# Patient Record
Sex: FEMALE
[De-identification: names, ages, dates, MRNs, and addresses within clinical notes are randomized per-mention and may not be internally consistent; named-entity substitution may affect disease eponyms.]

---

## 2019-04-12 ENCOUNTER — HOSPITAL ENCOUNTER (EMERGENCY)
Dept: HOSPITAL 31 - C.ER | Age: 49
Discharge: HOME | End: 2019-04-12
Payer: MEDICAID

## 2019-04-12 VITALS — HEART RATE: 71 BPM | DIASTOLIC BLOOD PRESSURE: 39 MMHG | SYSTOLIC BLOOD PRESSURE: 136 MMHG | TEMPERATURE: 99 F

## 2019-04-12 VITALS — RESPIRATION RATE: 16 BRPM

## 2019-04-12 VITALS — BODY MASS INDEX: 33.7 KG/M2

## 2019-04-12 VITALS — OXYGEN SATURATION: 100 %

## 2019-04-12 DIAGNOSIS — O01.9: Primary | ICD-10-CM

## 2019-04-12 LAB
ALBUMIN SERPL-MCNC: 4.4 G/DL (ref 3.5–5)
ALBUMIN/GLOB SERPL: 1.6 {RATIO} (ref 1–2.1)
ALT SERPL-CCNC: < 6 U/L (ref 9–52)
AST SERPL-CCNC: 27 U/L (ref 14–36)
BASOPHILS # BLD AUTO: 0 K/UL (ref 0–0.2)
BASOPHILS NFR BLD: 0.4 % (ref 0–2)
BILIRUB UR-MCNC: NEGATIVE MG/DL
BUN SERPL-MCNC: 8 MG/DL (ref 7–17)
CALCIUM SERPL-MCNC: 9.3 MG/DL (ref 8.6–10.4)
EOSINOPHIL # BLD AUTO: 0.1 K/UL (ref 0–0.7)
EOSINOPHIL NFR BLD: 0.8 % (ref 0–4)
ERYTHROCYTE [DISTWIDTH] IN BLOOD BY AUTOMATED COUNT: 13.9 % (ref 11.5–14.5)
GFR NON-AFRICAN AMERICAN: > 60
GLUCOSE UR STRIP-MCNC: NORMAL MG/DL
HGB BLD-MCNC: 13.9 G/DL (ref 11–16)
LEUKOCYTE ESTERASE UR-ACNC: (no result) LEU/UL
LIPASE: 123 U/L (ref 23–300)
LYMPHOCYTES # BLD AUTO: 2.2 K/UL (ref 1–4.3)
LYMPHOCYTES NFR BLD AUTO: 29 % (ref 20–40)
MCH RBC QN AUTO: 32.6 PG (ref 27–31)
MCHC RBC AUTO-ENTMCNC: 34.3 G/DL (ref 33–37)
MCV RBC AUTO: 95 FL (ref 81–99)
MONOCYTES # BLD: 0.5 K/UL (ref 0–0.8)
MONOCYTES NFR BLD: 6.6 % (ref 0–10)
NEUTROPHILS # BLD: 4.7 K/UL (ref 1.8–7)
NEUTROPHILS NFR BLD AUTO: 63.2 % (ref 50–75)
NRBC BLD AUTO-RTO: 0 % (ref 0–2)
PH UR STRIP: 6 [PH] (ref 5–8)
PLATELET # BLD: 196 K/UL (ref 130–400)
PMV BLD AUTO: 9.1 FL (ref 7.2–11.7)
PROT UR STRIP-MCNC: NEGATIVE MG/DL
RBC # BLD AUTO: 4.26 MIL/UL (ref 3.8–5.2)
RBC # UR STRIP: NEGATIVE /UL
SP GR UR STRIP: 1.02 (ref 1–1.03)
SQUAMOUS EPITHIAL: 5 /HPF (ref 0–5)
UROBILINOGEN UR-MCNC: NORMAL MG/DL (ref 0.2–1)
WBC # BLD AUTO: 7.4 K/UL (ref 4.8–10.8)

## 2019-04-12 NOTE — CP.PCM.CON
<Damaris Booker - Last Filed: 19 19:21>





History of Present Illness





- History of Present Illness


History of Present Illness: 





OBGYN Consult Note for Dr. Rainey





48 year old  at 12.1 weeks gestational age by LMP 2019 presents to 

the ER with abdominal pain and bleeding. During her visit to the ER, patient 

states that her lower abdominal pain was just this morning and it has resolved. 

She also noted some bleeding for two days that was not much but has diminished. 

She is a patient of [facility]. Patient feels fine now. Of note, no 

more than a month ago, she was told that her fetus is not living. But she did 

not follow up with a doctor for care at a later time.





Last time she ate was very little with coffee at 7am this morning. She last 

drank water provided by the ER to increase her bladder volume for her 

transvaginal ultrasound. Patient is not in any type of pain now.





PMHx: denies


PSHx: lap cholecystectomy due to gallbladder cysts about 10 years ago


SocHx: Denies tobacco, EtOH rarely (few times a year during holidays), and 

denies illicit drugs.


Meds: denies taking medications now


Allergies: NKDA





Past Patient History





- Past Social History


Smoking Status: Never Smoked





- PSYCHIATRIC


Hx Substance Use: No





- SURGICAL HISTORY


Hx Cholecystectomy: Yes





- ANESTHESIA


Hx Anesthesia: Yes





Meds


Allergies/Adverse Reactions: 


                                    Allergies











Allergy/AdvReac Type Severity Reaction Status Date / Time


 


No Known Allergies Allergy   Verified 19 14:41














Physical Exam





- Constitutional


Appears: Well, Non-toxic





- Head Exam


Head Exam: ATRAUMATIC, NORMAL INSPECTION





- Eye Exam


Eye Exam: Normal appearance





- Neck Exam


Neck exam: Positive for: Normal Inspection





- Respiratory Exam


Respiratory Exam: Clear to Auscultation Bilateral, NORMAL BREATHING PATTERN





- Cardiovascular Exam


Cardiovascular Exam: REGULAR RHYTHM, RRR





-  Exam


Speculum exam: NORMAL SPECULUM EXAM.  absent: Erythema, Foreign Body, 

Laceration, Tissue, Vaginal Bleeding, Vaginal Discharge


Bimanual exam: NORMAL BIMANUAL EXAM (Mild tenderness to palpation of the 

suprapubic area during bimanual exam).  absent: Adenexal Mass





- Back Exam


Back exam: NORMAL INSPECTION





- Neurological Exam


Neurological exam: Alert, Oriented x3





- Psychiatric Exam


Psychiatric exam: Normal Affect, Normal Mood





- Skin


Skin Exam: Normal Color, Warm





Results





- Vital Signs


Recent Vital Signs: 


                                Last Vital Signs











Temp  99.0 F   19 17:11


 


Pulse  71   19 17:11


 


Resp  16   19 17:11


 


BP  136/39 L  19 17:11


 


Pulse Ox  100   19 18:39














- Labs


Result Diagrams: 


                                 19 15:26





                                 19 15:26


Labs: 


                         Laboratory Results - last 24 hr











  19





  15:26 15:26 15:26


 


WBC  7.4  


 


RBC  4.26  


 


Hgb  13.9  


 


Hct  40.4  


 


MCV  95.0  


 


MCH  32.6 H  


 


MCHC  34.3  


 


RDW  13.9  


 


Plt Count  196  


 


MPV  9.1  


 


Neut % (Auto)  63.2  


 


Lymph % (Auto)  29.0  


 


Mono % (Auto)  6.6  


 


Eos % (Auto)  0.8  


 


Baso % (Auto)  0.4  


 


Neut # (Auto)  4.7  


 


Lymph # (Auto)  2.2  


 


Mono # (Auto)  0.5  


 


Eos # (Auto)  0.1  


 


Baso # (Auto)  0.0  


 


Sodium    134


 


Potassium    4.0


 


Chloride    102


 


Carbon Dioxide    27


 


Anion Gap    9 L


 


BUN    8


 


Creatinine    0.5 L


 


Est GFR ( Amer)    > 60


 


Est GFR (Non-Af Amer)    > 60


 


Random Glucose    83


 


Calcium    9.3


 


Total Bilirubin    0.5


 


AST    27


 


ALT    < 6 L


 


Alkaline Phosphatase    56


 


Total Protein    7.1


 


Albumin    4.4


 


Globulin    2.7


 


Albumin/Globulin Ratio    1.6


 


Lipase    123


 


Beta HCG, Quant    983671.00


 


Urine Color   Yellow 


 


Urine Clarity   Hazy 


 


Urine pH   6.0 


 


Ur Specific Gravity   1.016 


 


Urine Protein   Negative 


 


Urine Glucose (UA)   Normal 


 


Urine Ketones   Trace 


 


Urine Blood   Negative 


 


Urine Nitrate   Negative 


 


Urine Bilirubin   Negative 


 


Urine Urobilinogen   Normal 


 


Ur Leukocyte Esterase   Neg 


 


Urine WBC (Auto)   2 


 


Urine RBC (Auto)   3 


 


Ur Squamous Epith Cells   5 


 


Blood Type   


 


Antibody Screen   














  19





  15:26


 


WBC 


 


RBC 


 


Hgb 


 


Hct 


 


MCV 


 


MCH 


 


MCHC 


 


RDW 


 


Plt Count 


 


MPV 


 


Neut % (Auto) 


 


Lymph % (Auto) 


 


Mono % (Auto) 


 


Eos % (Auto) 


 


Baso % (Auto) 


 


Neut # (Auto) 


 


Lymph # (Auto) 


 


Mono # (Auto) 


 


Eos # (Auto) 


 


Baso # (Auto) 


 


Sodium 


 


Potassium 


 


Chloride 


 


Carbon Dioxide 


 


Anion Gap 


 


BUN 


 


Creatinine 


 


Est GFR ( Amer) 


 


Est GFR (Non-Af Amer) 


 


Random Glucose 


 


Calcium 


 


Total Bilirubin 


 


AST 


 


ALT 


 


Alkaline Phosphatase 


 


Total Protein 


 


Albumin 


 


Globulin 


 


Albumin/Globulin Ratio 


 


Lipase 


 


Beta HCG, Quant 


 


Urine Color 


 


Urine Clarity 


 


Urine pH 


 


Ur Specific Gravity 


 


Urine Protein 


 


Urine Glucose (UA) 


 


Urine Ketones 


 


Urine Blood 


 


Urine Nitrate 


 


Urine Bilirubin 


 


Urine Urobilinogen 


 


Ur Leukocyte Esterase 


 


Urine WBC (Auto) 


 


Urine RBC (Auto) 


 


Ur Squamous Epith Cells 


 


Blood Type  O POSITIVE


 


Antibody Screen  Negative














Assessment & Plan





- Assessment and Plan (Free Text)


Assessment: 





48 year old  at 12.1 weeks gestational age by LMP 2019 presents to 

the ER with lower abdominal pain and bleeding. Hemodynamically stable, CBC wnl, 

and GYN exam unremarkable.


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 


-Due to patient's recent consumption of water in the ER and increased risk of 

aspiration if she is to go to the OR today, she will need to have D&C scheduled 

another time. Patient is stable so elective surgery is to be done 1-2 weeks. 

After D&C to remove products of conception, will need to follow up with weekly 

HCG until no more HCG.


-patient will be contacted next week after we speak to clinic to inquire about 

her following up. Patient can be reached at [phone]. She agrees to the 

above plan.





case discussed with Dr. Brigid Booker PGY1





<Grecia Rainey S - Last Filed: 04/15/19 10:15>





Physical Exam





- GI/Abdominal Exam


GI & Abdominal Exam: Soft.  absent: Guarding, Tenderness





-  Exam


External exam: NORMAL EXTERNAL EXAM


Bimanual exam: absent: Cervical Motion Tendernes, Uterine Enlargement, Uterine 

Tenderness





- Extremities Exam


Extremities exam: Positive for: normal inspection





Results





- Vital Signs


Recent Vital Signs: 


                                Last Vital Signs











Temp  99.0 F   19 17:11


 


Pulse  71   19 17:11


 


Resp  16   19 17:11


 


BP  136/39 L  19 17:11


 


Pulse Ox  100   19 18:39














- Labs


Result Diagrams: 


                                 19 15:26





                                 19 15:26





Assessment & Plan





- Assessment and Plan (Free Text)


Assessment: 


Late Entry: [facility] attending on call


Agree with note above w/ following  clarification and modifications:Pt 


Pt seen & examined by me w/ dr booker. Pt denied heavy bleeding/pelvic pain and 

states she goes to [facility] for her care.


I: Hydatidiform mole


   AMA


P: Pt advised that this nonviable preg appears to be a molar preg. Risk of 

subsequent ca d/w pt and importance of f/u emphasized. She was told she needs to

have this procedure scheduled in a medical center where she can have follow 

serial HCGS and be referred to oncologist if needed.  She understands and states

she will dos this.


Pt called this morning she stated she was at this very moment in [place] 

for follow up and referral for further management.

## 2019-04-12 NOTE — US
Date of service: 



04/12/2019



HISTORY:

bleeding in pregnancy, assess cervix



LMP 01/17/2019. 



Beta HCG results: [id number] units.



COMPARISON:

None available.



TECHNIQUE:

Transabdominal, transvaginal. Real -time technique with 2D, duplex 

and color Doppler.



FINDINGS:



UTERUS:

Measures 7.2 x 5.8 x 11.2 cm. Heterogeneous echo characteristics. 

Solitary submucosal fibroid lower uterine segment 0.8 x 1.2 cm.



ENDOMETRIUM:

Measures 32.2 mm in diameter. Markedly thickened, irregular 

heterogeneous echo characteristics of the endometrium.  No visible 

products of conception. 



CERVIX:

Closed cervix measures 3.57 cm.



RIGHT OVARY:

Measures 2.9 x 4.5 x 4.9 cm. Complex, septated cyst with thick 

irregular wall.  This measures 2.9 x 3.9 x 3.8 cm.  Normal flow. 



LEFT OVARY:

Measures 2.6 x 2.1 x 2.6 cm. No solid mass. Normal flow. 



FREE FLUID:

No significant free fluid noted.



OTHER FINDINGS:

None. 



IMPRESSION:

1. Markedly thickened heterogeneous endometrium.  The findings in 

conjunction with the markedly elevated beta HCG suggests molar 

pregnancy/gestational trophoblastic disease.



2. Septated right adnexal cyst.

## 2019-04-12 NOTE — C.PDOC
History Of Present Illness


Patient is a 48 year old female, 11 weeks pregnant, who presents to the ED c/o 

vaginal bleeding with associated LLQ that began today. Patient reports that she 

had a US at 9 weeks, which showed a possible miscarriage. She denies any fever, 

CP, SOB, or dysuria.  


 





Time Seen by Provider: 04/12/19 14:54


Chief Complaint (Nursing): Abdominal Pain


History Per: Patient


History/Exam Limitations: no limitations


Onset/Duration Of Symptoms: Hrs


Current Symptoms Are (Timing): Still Present


Location Of Pain/Discomfort: LLQ


Associated Symptoms: denies: Fever, Chest Pain, Urinary Symptoms


Recent travel outside of the [place]: No


Additional History Per: Patient


Abnormal Vaginal Bleeding: Yes





Past Medical History


Reviewed: Historical Data, Nursing Documentation, Vital Signs


Vital Signs: 





                                Last Vital Signs











Temp  98.8 F   04/12/19 14:41


 


Pulse  81   04/12/19 14:41


 


Resp  16   04/12/19 14:41


 


BP  145/83   04/12/19 14:41


 


Pulse Ox  100   04/12/19 14:41











Surgical History: Cholecystectomy


Family History: States: No Known Family Hx





- Social History


Hx Alcohol Use: Yes


Hx Substance Use: No





- Immunization History


Hx Tetanus Toxoid Vaccination: No


Hx Influenza Vaccination: No


Hx Pneumococcal Vaccination: No





Review Of Systems


Except As Marked, All Systems Reviewed And Found Negative.


Cardiovascular: Negative for: Chest Pain


Respiratory: Negative for: Shortness of Breath





Physical Exam





- Physical Exam


Additional Physical Exam Comments: 


Constitutional: No acute distress.


Head: Normocephalic. Atraumatic.


Eyes: PERRL.


ENT: Moist mucous membranes.


Neck: Supple.


Cardiovascular: Regular rate. Radial pulse 2+ bilaterally.


Chest: No tenderness.


Respiratory: Clear to auscultation bilaterally.


GI: Soft. Nontender. Nondistended.


Back: No CVA tenderness.


Musculoskeletal: No tenderness or swelling of extremities.


Skin: No rash.


Neurologic: Alert, no focal deficit.








ED Course And Treatment





- Laboratory Results


Result Diagrams: 


                                 04/12/19 15:26





                                 04/12/19 15:26


O2 Sat by Pulse Oximetry: 100 (on RA)


Pulse Ox Interpretation: Normal





Medical Decision Making


Medical Decision Making: 


Plan:


Labs


Urinalysis


Urine Culture


US Transvaginal





Date of service: 





04/12/2019





HISTORY:


bleeding in pregnancy, assess cervix





LMP 01/17/2019. 





Beta HCG results: [id number] units.





COMPARISON:


None available.





TECHNIQUE:


Transabdominal, transvaginal. Real -time technique with 2D, duplex and color 

Doppler.





FINDINGS:





UTERUS:


Measures 7.2 x 5.8 x 11.2 cm. Heterogeneous echo characteristics. Solitary 

submucosal fibroid lower uterine segment 0.8 x 1.2 cm.





ENDOMETRIUM:


Measures 32.2 mm in diameter. Markedly thickened, irregular heterogeneous echo 

characteristics of the endometrium.  No visible products of conception. 





CERVIX:


Closed cervix measures 3.57 cm.





RIGHT OVARY:


Measures 2.9 x 4.5 x 4.9 cm. Complex, septated cyst with thick irregular wall.  

This measures 2.9 x 3.9 x 3.8 cm.  Normal flow. 





LEFT OVARY:


Measures 2.6 x 2.1 x 2.6 cm. No solid mass. Normal flow. 





FREE FLUID:


No significant free fluid noted.





OTHER FINDINGS:


None. 





IMPRESSION:


1. Markedly thickened heterogeneous endometrium.  The findings in conjunction 

with the markedly elevated beta HCG suggests molar pregnancy/gestational 

trophoblastic disease.





2. Septated right adnexal cyst.





Dr. Brigid LAMBERT on call consulted, came to see and evaluate patient at 

bedside in ED. She recommends discharge at this time as patient is stable 

without active bleeding and unremarkable Hb. She is instructing patient to 

follow up with [facility] in [place] to have D&C scheduled. She 

states she will have patient's contact information collected so they can check 

on patient and ensure she followed up expeditiously. 





Disposition





- Disposition


Referrals: 


[facility] [Outside]


Disposition: HOME/ ROUTINE


Disposition Time: 17:55


Condition: STABLE


Instructions:  Gestational Trophoblastic Disease


Forms:  [facility] (English)


Print Language: Lao





- Clinical Impression


Clinical Impression: 


 Molar pregnancy








- Scribe Statement


The provider has reviewed the documentation as recorded by the Marion Sanchez





All medical record entries made by the Julianibramon were at my direction and 

personally dictated by me. I have reviewed the chart and agree that the record 

accurately reflects my personal performance of the history, physical exam, 

medical decision making, and the department course for this patient. I have also

 personally directed, reviewed, and agree with the discharge instructions and 

disposition.

## 2019-04-17 ENCOUNTER — HOSPITAL ENCOUNTER (OUTPATIENT)
Dept: HOSPITAL 14 - H.OPSURG | Age: 49
Discharge: HOME | End: 2019-04-17
Attending: OBSTETRICS & GYNECOLOGY
Payer: MEDICAID

## 2019-04-17 VITALS
OXYGEN SATURATION: 100 % | DIASTOLIC BLOOD PRESSURE: 78 MMHG | HEART RATE: 75 BPM | TEMPERATURE: 98.2 F | SYSTOLIC BLOOD PRESSURE: 132 MMHG

## 2019-04-17 VITALS — RESPIRATION RATE: 18 BRPM

## 2019-04-17 VITALS — BODY MASS INDEX: 34.2 KG/M2

## 2019-04-17 DIAGNOSIS — O02.0: Primary | ICD-10-CM

## 2019-04-17 DIAGNOSIS — Z3A.11: ICD-10-CM

## 2019-04-17 LAB
BASOPHILS # BLD AUTO: 0 K/UL (ref 0–0.2)
BASOPHILS NFR BLD: 0.3 % (ref 0–2)
EOSINOPHIL # BLD AUTO: 0 K/UL (ref 0–0.7)
EOSINOPHIL NFR BLD: 0.7 % (ref 0–4)
ERYTHROCYTE [DISTWIDTH] IN BLOOD BY AUTOMATED COUNT: 14.2 % (ref 11.5–14.5)
HGB BLD-MCNC: 13.8 G/DL (ref 12–16)
LYMPHOCYTES # BLD AUTO: 1.7 K/UL (ref 1–4.3)
LYMPHOCYTES NFR BLD AUTO: 28.8 % (ref 20–40)
MCH RBC QN AUTO: 31.6 PG (ref 27–31)
MCHC RBC AUTO-ENTMCNC: 33.4 G/DL (ref 33–37)
MCV RBC AUTO: 94.5 FL (ref 81–99)
MONOCYTES # BLD: 0.4 K/UL (ref 0–0.8)
MONOCYTES NFR BLD: 6.3 % (ref 0–10)
NEUTROPHILS # BLD: 3.7 K/UL (ref 1.8–7)
NEUTROPHILS NFR BLD AUTO: 63.9 % (ref 50–75)
NRBC BLD AUTO-RTO: 0 % (ref 0–0)
PLATELET # BLD: 186 K/UL (ref 130–400)
PMV BLD AUTO: 9 FL (ref 7.2–11.7)
RBC # BLD AUTO: 4.35 MIL/UL (ref 3.8–5.2)
WBC # BLD AUTO: 5.8 K/UL (ref 4.8–10.8)

## 2019-04-17 PROCEDURE — 86850 RBC ANTIBODY SCREEN: CPT

## 2019-04-17 PROCEDURE — 36415 COLL VENOUS BLD VENIPUNCTURE: CPT

## 2019-04-17 PROCEDURE — 88305 TISSUE EXAM BY PATHOLOGIST: CPT

## 2019-04-17 PROCEDURE — 85025 COMPLETE CBC W/AUTO DIFF WBC: CPT

## 2019-04-17 PROCEDURE — 86920 COMPATIBILITY TEST SPIN: CPT

## 2019-04-17 PROCEDURE — 86900 BLOOD TYPING SEROLOGIC ABO: CPT

## 2019-04-17 PROCEDURE — 59820 CARE OF MISCARRIAGE: CPT

## 2019-04-17 NOTE — CP.SDSHP
Same Day Surgery H & P





- Allergies


Allergies: 


Allergies





No Known Allergies Allergy (Verified 04/17/19 14:52)


   











- Physical Exam


Vital Signs: 


                                   Vital Signs











  04/17/19





  14:49


 


Temperature 98.3 F


 


Pulse Rate 68


 


Respiratory 13





Rate 


 


Blood Pressure 135/79


 


O2 Sat by Pulse 98





Oximetry 














Short Stay Discharge





- Short Stay Discharge


Admitting Diagnosis/Reason for Visit: MOLAR PREGNANCY


Progress Note/Discharge Note with Instructions: 





The patient here for molar pregnancy we discussed appropriate management we 

discussed appropriate follow-up patient aware she needs beta quant's monthly for

1 year.  Patient was counseled regarding the propensity to behave like cancer 

and become metastatic.  The patient was given the opportunity to ask questions. 

The patient was informed she should not get pregnant for 1 year.  All her 

questions were answered and patient agreed to plan of care

## 2019-04-18 NOTE — OP
PROCEDURE DATE:  04/17/2019



PREOPERATIVE DIAGNOSIS:  Molar pregnancy, 11 weeks gestation.



POSTOPERATIVE DIAGNOSIS:  Molar pregnancy, 11 weeks gestation.



OPERATION PERFORMED:  Suction D and C.



SURGEON:  Lela Péerz MD.



ANESTHESIA:  General.



ANESTHESIA ADMINISTERED BY:  Shakir Ortez MD



ESTIMATED BLOOD LOSS:  75 mL.



The patient was straight catheterized prior to starting the procedure.



OPERATIVE FINDINGS:  Normal external female genitalia.  Urethra normal. 

Cervix smooth.  Uterus anteverted, approximately 11 weeks size.



DESCRIPTION OF PROCEDURE:  After informed consent was obtained, the patient

was brought to the operating room.  She was given Methergine.  She was then

prepped and draped in a normal sterile fashion.  Weighted speculum was

inserted into the vagina.  The cervix was visualized and grasped with a

single-tooth tenaculum.  The cervix was then gently dilated.  A #10 curved

suction curette was inserted into the uterine cavity, activated and rotated

in a clockwise fashion.  A sharp curettage was then performed until a

gritty texture was noted.  The suction device was then inserted back into

the uterine cavity and rotated in a clockwise fashion to remove any

remaining debris.  All suction device was then removed from the cervix. 

The tenaculum site was inspected and noted to be hemostatic.  Procedure was

terminated, and the patient was taken to recovery room in awake and stable

condition.







__________________________________________

Lela Pérez MD





DD:  04/17/2019 16:44:30

DT:  04/17/2019 21:49:44

Job # [id number]